# Patient Record
Sex: FEMALE
[De-identification: names, ages, dates, MRNs, and addresses within clinical notes are randomized per-mention and may not be internally consistent; named-entity substitution may affect disease eponyms.]

---

## 2018-06-18 ENCOUNTER — HOSPITAL ENCOUNTER (OUTPATIENT)
Dept: HOSPITAL 92 - RAD-FRANK | Age: 68
Discharge: HOME | End: 2018-06-18
Attending: NURSE PRACTITIONER
Payer: MEDICARE

## 2018-06-18 DIAGNOSIS — M79.89: ICD-10-CM

## 2018-06-18 DIAGNOSIS — M79.671: Primary | ICD-10-CM

## 2018-06-18 NOTE — RAD
THREE VIEWS RIGHT FOOT:

 

Date: 6-18-18

 

History: Right foot pain. 

 

FINDINGS: 

No acute fracture or dislocation is seen. Posterior and plantar calcaneal enthesophytes are seen. Min
imal degenerative changes are seen at the talonavicular joint. There is mild subcutaneous soft tissue
 swelling seen at the dorsal aspect of the forefoot. 

 

IMPRESSION: 

1. Minimal subcutaneous soft tissue swelling, but no acute osseous abnormality seen involving the rig
ht foot. 

 

POS: [facility]

## 2018-11-15 ENCOUNTER — HOSPITAL ENCOUNTER (OUTPATIENT)
Dept: HOSPITAL 92 - BICMAMMO | Age: 68
Discharge: HOME | End: 2018-11-15
Attending: NURSE PRACTITIONER
Payer: MEDICARE

## 2018-11-15 DIAGNOSIS — Z12.31: Primary | ICD-10-CM

## 2018-11-15 DIAGNOSIS — Z80.3: ICD-10-CM

## 2018-11-15 PROCEDURE — 77063 BREAST TOMOSYNTHESIS BI: CPT

## 2018-11-15 PROCEDURE — 77067 SCR MAMMO BI INCL CAD: CPT

## 2019-08-06 ENCOUNTER — HOSPITAL ENCOUNTER (OUTPATIENT)
Dept: HOSPITAL 92 - RAD-FRANK | Age: 69
Discharge: HOME | End: 2019-08-06
Attending: NURSE PRACTITIONER
Payer: MEDICARE

## 2019-08-06 DIAGNOSIS — M79.672: Primary | ICD-10-CM

## 2019-08-06 DIAGNOSIS — M77.32: ICD-10-CM

## 2019-08-06 NOTE — RAD
XR Foot Lt 3 View STANDARD



HISTORY:  left foot pain



FINDINGS:

No fracture or dislocation is identified. Posterior and plantar calcaneal spurs are present.



Reported By: Walker Baptiste 

Electronically Signed:  8/6/2019 3:07 PM

## 2019-11-21 ENCOUNTER — HOSPITAL ENCOUNTER (OUTPATIENT)
Dept: HOSPITAL 92 - BICMAMMO | Age: 69
Discharge: HOME | End: 2019-11-21
Attending: NURSE PRACTITIONER
Payer: MEDICARE

## 2019-11-21 DIAGNOSIS — Z12.31: Primary | ICD-10-CM

## 2019-11-21 DIAGNOSIS — Z80.3: ICD-10-CM

## 2019-11-21 PROCEDURE — 77067 SCR MAMMO BI INCL CAD: CPT

## 2019-11-21 PROCEDURE — 77063 BREAST TOMOSYNTHESIS BI: CPT

## 2019-11-21 NOTE — MMO
Bilateral MAMMO Bilat Screen [facility].

 

CLINICAL HISTORY:

Patient is 69 years old and is seen for screening. The patient has the following

family history of breast cancer:  sister.  The patient has no personal history

of cancer.

 

VIEWS:

The views performed were:  bilateral craniocaudal with tomosynthesis and

bilateral mediolateral oblique with tomosynthesis.

 

FILMS COMPARED:

The present examination has been compared to prior imaging studies performed at

[facility] on 04/24/2017, 04/28/2017, 10/30/2017 and 11/15/2018.

 

This study has been interpreted with the assistance of computer-aided detection.

 

MAMMOGRAM FINDINGS:

There are scattered fibroglandular densities.

 

There are stable benign appearing calcifications seen in both breasts.

 

Nodularity is stable.

 

There are no suspicious masses, suspicious calcifications, or new areas of

architectural distortion.

 

IMPRESSION:

THERE IS NO MAMMOGRAPHIC EVIDENCE OF MALIGNANCY.

 

A ROUTINE FOLLOW-UP MAMMOGRAM IN 1 YEAR IS RECOMMENDED.

 

THE RESULTS OF THIS EXAM WERE SENT TO THE PATIENT.

 

ACR BI-RADS Category 2 - Benign finding

 

MAMMOGRAPHY NOTE:

 1. A negative mammogram report should not delay a biopsy if a dominant of

 clinically suspicious mass is present.

 2. Approximately 10% to 15% of breast cancers are not detected by

 mammography.

 3. Adenosis and dense breasts may obscure an underlying neoplasm.

 

 

Reported by: AARON POOL MD

Electonically Signed: [id number]

## 2020-06-02 ENCOUNTER — HOSPITAL ENCOUNTER (OUTPATIENT)
Dept: HOSPITAL 92 - BICULT | Age: 70
Discharge: HOME | End: 2020-06-02
Attending: NURSE PRACTITIONER
Payer: MEDICARE

## 2020-06-02 DIAGNOSIS — R10.9: Primary | ICD-10-CM

## 2020-06-02 DIAGNOSIS — Z90.49: ICD-10-CM

## 2020-06-02 DIAGNOSIS — K76.0: ICD-10-CM

## 2020-06-02 PROCEDURE — 93975 VASCULAR STUDY: CPT

## 2020-06-02 NOTE — ULT
ABDOMINAL ULTRASOUND:

 

Date:  06/02/2020

 

INDICATION:

Abdominal pain. 

 

FINDINGS:

Patient is status post cholecystectomy. 

 

Common bile duct is normal caliber for post cholecystectomy status measuring 8.0 mm. No intrahepatic 
ductal dilatation. 

 

The liver is echogenic, suggesting hepatic steatosis. No focal liver lesion or mass. 

 

Visualized aorta and IVC appear unremarkable. 

 

The pancreas is mostly obscured, but appears unremarkable as visualized. 

 

Spleen is upper normal size and appears unremarkable. 

 

Both kidneys are imaged and appear unremarkable. Both kidneys measure 8-9 cm length. 

 

IMPRESSION: 

1.  Evidence of hepatic steatosis. 

2.  Status post cholecystectomy. 

 

 

 

 

POS: AH

## 2021-06-23 ENCOUNTER — HOSPITAL ENCOUNTER (OUTPATIENT)
Dept: HOSPITAL 92 - RAD-FRANK | Age: 71
Discharge: HOME | End: 2021-06-23
Attending: NURSE PRACTITIONER
Payer: MEDICARE

## 2021-06-23 DIAGNOSIS — M25.774: ICD-10-CM

## 2021-06-23 DIAGNOSIS — M25.471: ICD-10-CM

## 2021-06-23 DIAGNOSIS — M79.671: Primary | ICD-10-CM

## 2021-06-23 DIAGNOSIS — M79.89: ICD-10-CM

## 2023-02-20 ENCOUNTER — HOSPITAL ENCOUNTER (OUTPATIENT)
Dept: HOSPITAL 92 - RAD-FRANK | Age: 73
Discharge: HOME | End: 2023-02-20
Attending: NURSE PRACTITIONER
Payer: MEDICARE

## 2023-02-20 DIAGNOSIS — M79.645: Primary | ICD-10-CM

## 2023-04-19 ENCOUNTER — HOSPITAL ENCOUNTER (OUTPATIENT)
Dept: HOSPITAL 92 - BICMAMMO | Age: 73
Discharge: HOME | End: 2023-04-19
Attending: NURSE PRACTITIONER
Payer: MEDICARE

## 2023-04-19 DIAGNOSIS — Z94.89: ICD-10-CM

## 2023-04-19 DIAGNOSIS — Z80.3: ICD-10-CM

## 2023-04-19 DIAGNOSIS — Z12.31: Primary | ICD-10-CM

## 2023-04-19 DIAGNOSIS — N64.89: ICD-10-CM

## 2023-04-19 PROCEDURE — 77063 BREAST TOMOSYNTHESIS BI: CPT

## 2023-04-19 PROCEDURE — 77067 SCR MAMMO BI INCL CAD: CPT
